# Patient Record
Sex: FEMALE | Race: WHITE
[De-identification: names, ages, dates, MRNs, and addresses within clinical notes are randomized per-mention and may not be internally consistent; named-entity substitution may affect disease eponyms.]

---

## 2020-03-02 ENCOUNTER — HOSPITAL ENCOUNTER (EMERGENCY)
Dept: HOSPITAL 25 - UCCORT | Age: 60
Discharge: HOME | End: 2020-03-02
Payer: COMMERCIAL

## 2020-03-02 VITALS — DIASTOLIC BLOOD PRESSURE: 79 MMHG | SYSTOLIC BLOOD PRESSURE: 183 MMHG

## 2020-03-02 DIAGNOSIS — M79.10: Primary | ICD-10-CM

## 2020-03-02 DIAGNOSIS — Z87.891: ICD-10-CM

## 2020-03-02 DIAGNOSIS — R50.9: ICD-10-CM

## 2020-03-02 DIAGNOSIS — I10: ICD-10-CM

## 2020-03-02 DIAGNOSIS — R51: ICD-10-CM

## 2020-03-02 DIAGNOSIS — J02.9: ICD-10-CM

## 2020-03-02 DIAGNOSIS — K21.9: ICD-10-CM

## 2020-03-02 DIAGNOSIS — Z79.899: ICD-10-CM

## 2020-03-02 DIAGNOSIS — R09.81: ICD-10-CM

## 2020-03-02 DIAGNOSIS — R05: ICD-10-CM

## 2020-03-02 DIAGNOSIS — R09.89: ICD-10-CM

## 2020-03-02 PROCEDURE — 99212 OFFICE O/P EST SF 10 MIN: CPT

## 2020-03-02 PROCEDURE — G0463 HOSPITAL OUTPT CLINIC VISIT: HCPCS

## 2020-03-02 NOTE — UC
FLU HPI





- HPI Summary


HPI Summary: 





59-year-old female who has had flulike illness over the past few days.





- History of Current Complaint


Chief Complaint: UCGeneralIllness


Stated Complaint: SOLD SYMP


Time Seen by Provider: 03/02/20 08:34


Hx Obtained From: Patient


Pregnant?: No


Onset/Duration: Gradual Onset


Severity Currently: Mild


Severity Initially: Moderate


Pain Intensity: 0


Associated Signs & Symptoms: Positive: Fever, Myalgia, Cough, Sore Throat, 

Nasal Congestion, Headache





- Allergy/Home Medications


Allergies/Adverse Reactions: 


 Allergies











Allergy/AdvReac Type Severity Reaction Status Date / Time


 


No Known Allergies Allergy   Verified 03/02/20 08:35











Home Medications: 


 Home Medications





Esomeprazole Magnesium [Nexium 24Hr] 20 mg PO BID 06/24/19 [History Confirmed 03 /02/20]


Acetaminophen [Tylenol] 325 mg PO ONCE 03/02/20 [History Confirmed 03/02/20]


Benzonatate CAP* [Tessalon 100 MG CAP*] 100 mg PO TID PRN #21 cap 03/02/20 [Rx]











PMH/Surg Hx/FS Hx/Imm Hx


Previously Healthy: Yes


Cardiovascular History: Hypertension


GI/ History: Gastroesophageal Reflux





- Surgical History


Surgical History: Yes


Surgery Procedure, Year, and Place: tubal ligation.  bilateral carpal tunnel 

release





- Family History


Known Family History: Positive: Unknown





- Social History


Occupation: Works From/At Home


Lives: With Family


Alcohol Use: Occasionally


Substance Use Type: None


Smoking Status (MU): Former Smoker





Review of Systems


All Other Systems Reviewed And Are Negative: Yes


Constitutional: Positive: Fever, Chills


ENT: Positive: Sore Throat, Nasal Discharge, Sinus Congestion


Respiratory: Positive: Cough


Is Patient Immunocompromised?: No





Physical Exam


Triage Information Reviewed: Yes


Appearance: Well-Appearing, No Pain Distress, Well-Nourished


Vital Signs: 


 Initial Vital Signs











Temp  98.3 F   03/02/20 08:36


 


Pulse  50   03/02/20 08:36


 


Resp  15   03/02/20 08:36


 


BP  183/79   03/02/20 08:36


 


Pulse Ox  97   03/02/20 08:36











Vital Signs Reviewed: Yes


Eyes: Positive: Conjunctiva Clear


ENT: Positive: Pharynx normal, TMs normal, Uvula midline


Neck: Positive: Supple, Nontender, No Lymphadenopathy


Respiratory: Positive: Lungs clear, Normal breath sounds, No respiratory 

distress, No accessory muscle use


Cardiovascular: Positive: RRR, No Murmur, Pulses Normal, Brisk Capillary Refill


Musculoskeletal Exam: Normal


Neurological Exam: Normal


Psychological Exam: Normal


Skin Exam: Normal





Flu Course/Dx





- Course


Course Of Treatment: 





Rapid flu test: Negative





Patient is comfortable here and nontoxic.





- Differential Dx/Diagnosis


Provider Diagnosis: 


 Flu-like symptoms








Discharge ED





- Sign-Out/Discharge


Documenting (check all that apply): Patient Departure


All imaging exams completed and their final reports reviewed: No Studies





- Discharge Plan


Condition: Good


Disposition: HOME


Prescriptions: 


Benzonatate CAP* [Tessalon 100 MG CAP*] 100 mg PO TID PRN #21 cap


 PRN Reason: Cough


Patient Education Materials:  Upper Respiratory Infection (ED)


Referrals: 


Bisi Shin PA [Primary Care Provider] - 


Additional Instructions: 


Increase fluids, rest, over-the-counter cold medicines as directed, follow-up 

with your primary care provider in 3 or 4 days if no improvement.





- Billing Disposition and Condition


Condition: GOOD


Disposition: Home